# Patient Record
Sex: FEMALE | Race: OTHER
[De-identification: names, ages, dates, MRNs, and addresses within clinical notes are randomized per-mention and may not be internally consistent; named-entity substitution may affect disease eponyms.]

---

## 2018-12-25 ENCOUNTER — HOSPITAL ENCOUNTER (EMERGENCY)
Dept: HOSPITAL 62 - ER | Age: 28
Discharge: HOME | End: 2018-12-25
Payer: COMMERCIAL

## 2018-12-25 VITALS — SYSTOLIC BLOOD PRESSURE: 147 MMHG | DIASTOLIC BLOOD PRESSURE: 89 MMHG

## 2018-12-25 DIAGNOSIS — I10: ICD-10-CM

## 2018-12-25 DIAGNOSIS — M79.89: Primary | ICD-10-CM

## 2018-12-25 PROCEDURE — 99283 EMERGENCY DEPT VISIT LOW MDM: CPT

## 2018-12-25 NOTE — ER DOCUMENT REPORT
ED Medical Screen (RME)





- General


Chief Complaint: Leg Swelling


Stated Complaint: SWELLING IN LEG


Time Seen by Provider: 12/25/18 12:41


Mode of Arrival: Ambulatory


Information source: Patient


TRAVEL OUTSIDE OF THE [place] IN LAST 30 DAYS: No





- HPI


Patient complains to provider of: Redness and itching in the left leg


Onset: Other - 28-year-old female with a history of hypertension in the past 

that presents for evaluation of redness and itching in the left lower extremity 

which she says developed approximately 3 days prior was initially bigger with 

little bit of burning and itching and steadily been improving there since she 

has been putting some ice on it to try and help with it.  She talked to a friend

today who made her scared and prompted her to get it "checked out".  She denies 

fevers, chills, shortness of breath abdominal pain diarrhea constipation dysuria

known insect exposures or otherwise.  She does not take any medications that 

either thin or thick in her blood, she does not have a history of blood clots in

the past, does not smoke cigarettes is never had a thromboembolic event.





- Related Data


Allergies/Adverse Reactions: 


                                        





Penicillins Allergy (Verified 12/25/18 12:29)


   











Past Medical History





- General


Information source: Patient





- Social History


Frequency of alcohol use: Occasional


Drug Abuse: None


Renal/ Medical History: Denies: Hx Peritoneal Dialysis





Review of Systems





- Review of Systems


-: Yes All other systems reviewed and negative





Physical Exam





- Vital signs


Vitals: 





                                        











Temp Pulse Resp BP Pulse Ox


 


 98.4 F   94   16   147/89 H  100 


 


 12/25/18 12:31  12/25/18 12:31  12/25/18 12:31  12/25/18 12:31  12/25/18 12:31











Interpretation: Normal





- General


General appearance: Appears well, Alert





- HEENT


Head: Normocephalic, Atraumatic


Eyes: Normal


Pupils: PERRL





- Respiratory


Respiratory status: No respiratory distress


Chest status: Nontender


Breath sounds: Normal


Chest palpation: Normal





- Cardiovascular


Rhythm: Regular


Heart sounds: Normal auscultation


Murmur: No





- Abdominal


Inspection: Normal


Distension: No distension


Bowel sounds: Normal


Tenderness: Nontender


Organomegaly: No organomegaly





- Back


Back: Normal, Nontender





- Extremities


General upper extremity: Normal inspection, Nontender, Normal color, Normal ROM,

Normal temperature


General lower extremity: Normal inspection, Nontender, Normal color, Normal ROM,

Normal temperature, Normal weight bearing.  No: Yuan's sign





- Neurological


Neuro grossly intact: Yes


Cognition: Normal


Orientation: AAOx4


Sutter Coma Scale Eye Opening: Spontaneous


Luis Coma Scale Verbal: Oriented


Luis Coma Scale Motor: Obeys Commands


Sutter Coma Scale Total: 15


Speech: Normal


Motor strength normal: LUE, RUE, LLE, RLE


Sensory: Normal





- Psychological


Associated symptoms: Normal affect, Normal mood





- Skin


Skin Temperature: Warm


Skin Moisture: Dry


Skin Color: Other - There is an erythematous confluent area of skin on the 

lateral aspect just inferior to the knee without any underlying fluctuance, 

overlying purulence or drainage.





Course





- Re-evaluation


Re-evalutation: 





12/25/18 12:49


There is a 28-year-old female who presents for evaluation of redness and 

swelling in the left lower extremity.


On examination the skin appears to be slightly inflamed.


He has been shrinking in size there since has an itching and burning quality, 

likely this represents an insect bite.


We will prevent this from developing into cellulitis if it were potentially to 

be developing.


Will be given Atarax as well as Keflex.


Patient was discharged with return precautions and expectant management.





12/25/18 12:50


Specifically I do not believe that this represents a more serious underlying 

cause of her leg redness such as but not limited to cellulitis, DVT, or 

otherwise.





- Vital Signs


Vital signs: 





                                        











Temp Pulse Resp BP Pulse Ox


 


 98.4 F   94   16   147/89 H  100 


 


 12/25/18 12:31  12/25/18 12:31  12/25/18 12:31  12/25/18 12:31  12/25/18 12:31














Doctor's Discharge





- Discharge


Clinical Impression: 


 Erythema, Skin swelling





Condition: Good


Disposition: HOME, SELF-CARE


Additional Instructions: 


You were seen today in the emergency department for the redness and swelling in 

your left leg.


You had evaluation including a physical exam.


I believe this is likely a reaction possibly to a bite.


You have been given a brief prescription of an antibiotic to prevent it from 

becoming a skin infection.


You should use any antihistamine which you prefer such as Benadryl, Zyrtec or 

otherwise to help with the itchiness.


Return in case you have any worsening fevers, chills, the redness begins to move

up the leg or you feel much worse.





Prescriptions: 


Cephalexin Monohydrate [Keflex 500 mg Capsule] 500 mg PO Q6H 5 Days #20 capsule


Hydroxyzine HCl [Atarax 25 mg Tablet] 1 - 2 tab PO QID #25 tablet

## 2019-01-03 ENCOUNTER — HOSPITAL ENCOUNTER (EMERGENCY)
Dept: HOSPITAL 62 - ER | Age: 29
Discharge: HOME | End: 2019-01-03
Payer: COMMERCIAL

## 2019-01-03 VITALS — DIASTOLIC BLOOD PRESSURE: 86 MMHG | SYSTOLIC BLOOD PRESSURE: 152 MMHG
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
## 2019-01-03 DIAGNOSIS — T63.421A: Primary | ICD-10-CM

## 2019-01-03 DIAGNOSIS — Z88.0: ICD-10-CM

## 2019-01-03 DIAGNOSIS — I10: ICD-10-CM

## 2019-01-03 LAB
ADD MANUAL DIFF: NO
ALBUMIN SERPL-MCNC: 4 G/DL (ref 3.5–5)
ALP SERPL-CCNC: 73 U/L (ref 38–126)
ALT SERPL-CCNC: 19 U/L (ref 9–52)
ANION GAP SERPL CALC-SCNC: 7 MMOL/L (ref 5–19)
AST SERPL-CCNC: 19 U/L (ref 14–36)
BASOPHILS # BLD AUTO: 0 10^3/UL (ref 0–0.2)
BASOPHILS NFR BLD AUTO: 0.3 % (ref 0–2)
BILIRUB DIRECT SERPL-MCNC: 0.1 MG/DL (ref 0–0.4)
BILIRUB SERPL-MCNC: 0.4 MG/DL (ref 0.2–1.3)
BUN SERPL-MCNC: 13 MG/DL (ref 7–20)
CALCIUM: 9.1 MG/DL (ref 8.4–10.2)
CHLORIDE SERPL-SCNC: 106 MMOL/L (ref 98–107)
CO2 SERPL-SCNC: 24 MMOL/L (ref 22–30)
EOSINOPHIL # BLD AUTO: 0.2 10^3/UL (ref 0–0.6)
EOSINOPHIL NFR BLD AUTO: 1.6 % (ref 0–6)
ERYTHROCYTE [DISTWIDTH] IN BLOOD BY AUTOMATED COUNT: 16.2 % (ref 11.5–14)
GLUCOSE SERPL-MCNC: 92 MG/DL (ref 75–110)
HCT VFR BLD CALC: 39.8 % (ref 36–47)
HGB BLD-MCNC: 13.2 G/DL (ref 12–15.5)
LYMPHOCYTES # BLD AUTO: 2.1 10^3/UL (ref 0.5–4.7)
LYMPHOCYTES NFR BLD AUTO: 18.2 % (ref 13–45)
MCH RBC QN AUTO: 26.2 PG (ref 27–33.4)
MCHC RBC AUTO-ENTMCNC: 33.1 G/DL (ref 32–36)
MCV RBC AUTO: 79 FL (ref 80–97)
MONOCYTES # BLD AUTO: 0.9 10^3/UL (ref 0.1–1.4)
MONOCYTES NFR BLD AUTO: 7.4 % (ref 3–13)
NEUTROPHILS # BLD AUTO: 8.4 10^3/UL (ref 1.7–8.2)
NEUTS SEG NFR BLD AUTO: 72.5 % (ref 42–78)
PLATELET # BLD: 312 10^3/UL (ref 150–450)
POTASSIUM SERPL-SCNC: 4.4 MMOL/L (ref 3.6–5)
PROT SERPL-MCNC: 7.2 G/DL (ref 6.3–8.2)
RBC # BLD AUTO: 5.03 10^6/UL (ref 3.72–5.28)
SODIUM SERPL-SCNC: 137.1 MMOL/L (ref 137–145)
TOTAL CELLS COUNTED % (AUTO): 100 %
WBC # BLD AUTO: 11.6 10^3/UL (ref 4–10.5)

## 2019-01-03 PROCEDURE — 80053 COMPREHEN METABOLIC PANEL: CPT

## 2019-01-03 PROCEDURE — 99283 EMERGENCY DEPT VISIT LOW MDM: CPT

## 2019-01-03 PROCEDURE — 36415 COLL VENOUS BLD VENIPUNCTURE: CPT

## 2019-01-03 PROCEDURE — S0028 INJECTION, FAMOTIDINE, 20 MG: HCPCS

## 2019-01-03 PROCEDURE — 85025 COMPLETE CBC W/AUTO DIFF WBC: CPT

## 2019-01-03 PROCEDURE — 96374 THER/PROPH/DIAG INJ IV PUSH: CPT

## 2019-01-03 PROCEDURE — 96375 TX/PRO/DX INJ NEW DRUG ADDON: CPT

## 2019-01-03 NOTE — ER DOCUMENT REPORT
ED General





- General


Chief Complaint: Allergic Reaction


Stated Complaint: SWELLING/ITCHING ARMS AND FACE


Time Seen by Provider: 01/03/19 09:19


Notes: 





Patient is a 28-year-old female that presents to the emergency department for 

chief complaint of possible allergic reaction.  Patient states that she was bit 

by fire ants on 31 December, on her feet and hands, she was initially seen in 

the ED, and was prescribed Keflex and hydroxyzine at that time for itching, for 

possible cellulitis but her symptoms persisted, and she got improvement of the 

redness that she initially had on her left lower extremity, but now she has 

redness extending from her middle finger of her left hand, up her forearm, as 

well as on her wrist on the right arm.  She denies having any pain but has 

pressure because she feels swollen as a result of this, she states she has mild 

tenderness with palpating both her forearms.  She denies having any throat 

swelling, or sensation of difficulty breathing, wheezing, nausea, or vomiting.  

She states she has been taking the Keflex but has missed several doses since it 

was prescribed.  She does report a history of penicillin allergy as well.





Past Medical History: Hypertension


Past Surgical History: Denies pertinent or recent surgical history


Social History: Denies tobacco, alcohol or drug use.


Family History: Reviewed and noncontributory for presenting illness


Allergies: Reviewed, see documented allergy list. 





REVIEW OF SYSTEMS:


Other than noted above, the 12 point review of systems was reviewed with the 

patient and were negative, all pertinent findings are included in the HPI.





PHYSICAL EXAMINATION:





Vital signs reviewed, nursing noted reviewed. 





GENERAL: Well-appearing, well-nourished and in no acute distress.





HEAD: Atraumatic, normocephalic.





EYES: Eyes appear normal, extraocular movements intact, sclera anicteric, 

conjunctiva are normal.





ENT: nares patent, oropharynx clear without exudates.  Moist mucous membranes.





NECK: Normal range of motion, supple without lymphadenopathy





LUNGS: Breath sounds clear to auscultation bilaterally and equal.  No wheezes 

rales or rhonchi.





HEART: Regular rate and rhythm without murmurs





ABDOMEN: Soft, nontender, normoactive bowel sounds.  No rebound, guarding, or 

rigidity. No masses appreciated.





EXTREMITIES: Nontender, good range of motion, no pitting or edema.  





NEUROLOGICAL: No focal neurological deficits. Moves all extremities 

spontaneously Motor and sensory grossly intact on exam.





PSYCH: Normal mood, normal affect.





SKIN: Warm, Dry, normal turgor, bilateral upper extremities, have erythematous 

areas, wrapping nearly circumferentially on the right wrist, with some 

associated edema, that is generalized, mild tenderness to palpation, but no 

increased warmth, there is similar erythematous area, with a few bite marks on 

the left middle finger, that has extension of the erythema from the middle 

finger up the dorsum of the left hand, into the forearm.  This erythema rash 

seems most consistent with an allergic reaction, localized secondary to fire ant

bites as opposed to cellulitis.


TRAVEL OUTSIDE OF THE [place] IN LAST 30 DAYS: No





- Related Data


Allergies/Adverse Reactions: 


                                        





Penicillins Allergy (Verified 01/03/19 09:00)


   











Past Medical History





- Social History


Smoking Status: Never Smoker


Family History: Reviewed & Not Pertinent


Patient has suicidal ideation: No


Patient has homicidal ideation: No


Renal/ Medical History: Denies: Hx Peritoneal Dialysis





Physical Exam





- Vital signs


Vitals: 


                                        











Temp Pulse Resp BP Pulse Ox


 


 98.6 F   87   14   149/88 H  100 


 


 01/03/19 09:06  01/03/19 09:06  01/03/19 09:06  01/03/19 09:06  01/03/19 09:06














Course





- Re-evaluation


Re-evalutation: 


Patient seen and examined vital signs reviewed. 





Laboratory data and imaging were ordered as appropriate for the patient's 

presenting symptoms and complaint, with consideration of any critical or life 

threatening conditions that may be associated with their obtained history and 

exam as noted above.





Patient was treated with IV Solu-Medrol, and IV Pepcid





Results were reviewed when available and demonstrated mild leukocytosis, likely 

acute phase stress reactant, secondary to fire ant significant localized 

reaction





The patient was re-evaluated and was stable, and improved, no signs of sepsis, 

patient did not appear ill, she only had mild tenderness with palpation to these

areas of erythema, which is leading more towards acute inflammatory reaction 

secondary to fire ant bites, as opposed to cellulitis, however it is also 

possible that the patient is having an allergic reaction to the Keflex given 

penicillin allergy, so I advised her to discontinue the Keflex, will place her o

n doxycycline, for 5 days twice daily, also given a prescription for prednisone,

and EpiPen, Pepcid and Zyrtec, to help with her symptoms, I advised her to use 

the EpiPen only if she had throat swelling, or difficulty breathing, and to 

carry it with her at all times, given the significant reaction that she had to 

this fire and bite.





Evaluation was most consistent with acute inflammatory response and allergic 

reaction





Results were discussed with the patient at this point, after careful consider

ation I feel that that patient can be discharged from the emergency department, 

the patient was educated treatments and reasons to return to the emergency 

department based on their presumed diagnosis as noted above, they were advised 

to followup with a primary care physician in 2-3 days. Patient was agreeable to 

plan of care.





*Note is created using voice recognition software and may contain spelling, 

syntax or grammatical errors.








Laboratory











  01/03/19 01/03/19





  09:29 09:29


 


WBC  11.6 H 


 


RBC  5.03 


 


Hgb  13.2 


 


Hct  39.8 


 


MCV  79 L 


 


MCH  26.2 L 


 


MCHC  33.1 


 


RDW  16.2 H 


 


Plt Count  312 


 


Seg Neutrophils %  72.5 


 


Lymphocytes %  18.2 


 


Monocytes %  7.4 


 


Eosinophils %  1.6 


 


Basophils %  0.3 


 


Absolute Neutrophils  8.4 H 


 


Absolute Lymphocytes  2.1 


 


Absolute Monocytes  0.9 


 


Absolute Eosinophils  0.2 


 


Absolute Basophils  0.0 


 


Sodium   137.1


 


Potassium   4.4


 


Chloride   106


 


Carbon Dioxide   24


 


Anion Gap   7


 


BUN   13


 


Creatinine   0.66


 


Est GFR ( Amer)   > 60


 


Est GFR (Non-Af Amer)   > 60


 


Glucose   92


 


Calcium   9.1


 


Total Bilirubin   0.4


 


Direct Bilirubin   0.1


 


Neonat Total Bilirubin   Not Reportable


 


Neonat Direct Bilirubin   Not Reportable


 


Neonat Indirect Bili   Not Reportable


 


AST   19


 


ALT   19


 


Alkaline Phosphatase   73


 


Total Protein   7.2


 


Albumin   4.0

















- Vital Signs


Vital signs: 


                                        











Temp Pulse Resp BP Pulse Ox


 


 98.5 F   85   14   152/86 H  98 


 


 01/03/19 10:21  01/03/19 10:21  01/03/19 09:06  01/03/19 10:21  01/03/19 10:21














- Laboratory


Result Diagrams: 


                                 01/03/19 09:29





                                 01/03/19 09:29


Laboratory results interpreted by me: 


                                        











  01/03/19





  09:29


 


WBC  11.6 H


 


MCV  79 L


 


MCH  26.2 L


 


RDW  16.2 H


 


Absolute Neutrophils  8.4 H














Discharge





- Discharge


Clinical Impression: 


Allergic reaction


Qualifiers:


 Encounter type: initial encounter Qualified Code(s): T78.40XA - Allergy, 

unspecified, initial encounter





Fire ant bite


Qualifiers:


 Encounter type: initial encounter Injury intent: accidental or unintentional 

Qualified Code(s): T63.421A - Toxic effect of venom of ants, accidental 

(unintentional), initial encounter





Condition: Stable


Disposition: HOME, SELF-CARE


Instructions:  Acute Allergic Reaction ([facility])


Additional Instructions: 


Please monitor for any worsening symptoms, and if you do have worsening redness,

fevers, chills, nausea or vomiting, do not hesitate to return to the emergency 

department.  Please take all medications as directed.  The EpiPen should be used

if you feel throat swelling, or difficulty breathing as a result of an insect 

sting, or other allergic reaction.


Prescriptions: 


RX: Doxycycline Hyclate [Vibramycin] 100 mg PO BID #10 capsule


Epinephrine [Epipen] 0.3 mg IJ PRN PRN #1 auto.injct


 PRN Reason: allergic reaction


Famotidine [Pepcid 20 mg Tablet] 20 mg PO BID #20 tablet


Loratadine [Claritin] 10 mg PO DAILY #30 tablet


RX: Prednisone [Deltasone] 20 mg PO DAILY #10 tablet


Forms:  Return to Work


Referrals: 


ANTHONY SILVER MD [Primary Care Provider] - Follow up in 3-5 days

## 2019-01-03 NOTE — ER DOCUMENT REPORT
ED Medical Screen (RME)





- General


Chief Complaint: Allergic Reaction


Stated Complaint: SWELLING/ITCHING ARMS AND FACE


Time Seen by Provider: 01/03/19 09:19


TRAVEL OUTSIDE OF THE [place] IN LAST 30 DAYS: No





- Related Data


Allergies/Adverse Reactions: 


                                        





Penicillins Allergy (Verified 01/03/19 09:00)


   











Past Medical History


Renal/ Medical History: Denies: Hx Peritoneal Dialysis





Physical Exam





- Vital signs


Vitals: 





                                        











Temp Pulse Resp BP Pulse Ox


 


 98.6 F   87   14   149/88 H  100 


 


 01/03/19 09:06  01/03/19 09:06  01/03/19 09:06  01/03/19 09:06  01/03/19 09:06














Course





- Re-evaluation


Re-evalutation: 





01/03/19 09:20


Is a 28-year-old female with a history of hypertension that presents for her 

second visit related to erythema and swelling.  Initially had been on her left 

lower extremity that was in her left upper extremity and right upper extremity 

which she attributes to having been bitten by a fire ant previously.


Her left upper extremity does demonstrate erythema proximal stranding from the 

wrist.


Believe that she will require more further evaluation.


Have initiated workup.


I have seen and performed a rapid medical screening examination on this patient,

workup has been initiated however there will require further evaluation 

reassessment and disposition determination from a secondary provider.





- Vital Signs


Vital signs: 





                                        











Temp Pulse Resp BP Pulse Ox


 


 98.6 F   87   14   149/88 H  100 


 


 01/03/19 09:06  01/03/19 09:06  01/03/19 09:06  01/03/19 09:06  01/03/19 09:06














Doctor's Discharge





- Discharge


Referrals: 


ANTHONY SILVER MD [Primary Care Provider] - Follow up as needed